# Patient Record
(demographics unavailable — no encounter records)

---

## 2024-10-07 NOTE — PHYSICAL EXAM
[Alert] : alert [No Acute Distress] : no acute distress [Normocephalic] : normocephalic [Conjunctivae with no discharge] : conjunctivae with no discharge [PERRL] : PERRL [EOMI Bilateral] : EOMI bilateral [Auricles Well Formed] : auricles well formed [Clear Tympanic membranes with present light reflex and bony landmarks] : clear tympanic membranes with present light reflex and bony landmarks [No Discharge] : no discharge [Nares Patent] : nares patent [Pink Nasal Mucosa] : pink nasal mucosa [Palate Intact] : palate intact [Nonerythematous Oropharynx] : nonerythematous oropharynx [Supple, full passive range of motion] : supple, full passive range of motion [No Palpable Masses] : no palpable masses [Symmetric Chest Rise] : symmetric chest rise [Clear to Auscultation Bilaterally] : clear to auscultation bilaterally [Regular Rate and Rhythm] : regular rate and rhythm [Normal S1, S2 present] : normal S1, S2 present [No Murmurs] : no murmurs [+2 Femoral Pulses] : +2 femoral pulses [Soft] : soft [NonTender] : non tender [Non Distended] : non distended [Normoactive Bowel Sounds] : normoactive bowel sounds [No Hepatomegaly] : no hepatomegaly [No Splenomegaly] : no splenomegaly [Keo: _____] : Keo [unfilled] [No Abnormal Lymph Nodes Palpated] : no abnormal lymph nodes palpated [No Gait Asymmetry] : no gait asymmetry [No pain or deformities with palpation of bone, muscles, joints] : no pain or deformities with palpation of bone, muscles, joints [Normal Muscle Tone] : normal muscle tone [Straight] : straight [+2 Patella DTR] : +2 patella DTR [Cranial Nerves Grossly Intact] : cranial nerves grossly intact [No Rash or Lesions] : no rash or lesions

## 2024-10-07 NOTE — DISCUSSION/SUMMARY
[Normal Growth] : growth [Normal Development] : development [None] : No known medical problems [No Elimination Concerns] : elimination [No Feeding Concerns] : feeding [No Skin Concerns] : skin [School] : school [Development and Mental Health] : development and mental health [Nutrition and Physical Activity] : nutrition and physical activity [Oral Health] : oral health [Safety] : safety [No Medications] : ~He/She~ is not on any medications [Mother] : mother [Full Activity without restrictions including Physical Education & Athletics] : Full Activity without restrictions including Physical Education & Athletics [I have examined the above-named student and completed the preparticipation physical evaluation. The athlete does not present apparent clinical contraindications to practice and participate in sport(s) as outlined above. A copy of the physical exam is on r] : I have examined the above-named student and completed the preparticipation physical evaluation. The athlete does not present apparent clinical contraindications to practice and participate in sport(s) as outlined above. A copy of the physical exam is on record in my office and can be made available to the school at the request of the parents. If conditions arise after the athlete has been cleared for participation, the physician may rescind the clearance until the problem is resolved and the potential consequences are completely explained to the athlete (and parents/guardians). [] : The components of the vaccine(s) to be administered today are listed in the plan of care. The disease(s) for which the vaccine(s) are intended to prevent and the risks have been discussed with the caretaker.  The risks are also included in the appropriate vaccination information statements which have been provided to the patient's caregiver.  The caregiver has given consent to vaccinate. [FreeTextEntry1] : Flu vaccine given and discussed. Annual PE/Dental. Balanced nutrition and daily exercise encouraged.

## 2024-10-07 NOTE — HISTORY OF PRESENT ILLNESS
[Mother] : mother [Fruit] : fruit [Vegetables] : vegetables [Meat] : meat [Grains] : grains [Eggs] : eggs [Dairy] : dairy [Normal] : Normal [Brushing teeth twice/d] : brushing teeth twice per day [Flossing teeth] : flossing teeth [Yes] : Patient goes to dentist yearly [Tap water] : Primary Fluoride Source: Tap water [Premenarche] : premenarche [Has Friends] : has friends [Grade ___] : Grade [unfilled] [No] : No cigarette smoke exposure [Up to date] : Up to date [NO] : No [FreeTextEntry7] : none [FreeTextEntry1] : Well visit for this  9 year old who is healthy. NKA. SHe eats well, sleeps well, has regular BMs and voids. SHe wants the Flu vaccine today. She is physically active, dances, does basketball, does softball, and is a swimmer.

## 2025-02-04 NOTE — HISTORY OF PRESENT ILLNESS
[de-identified] : Sore throat [FreeTextEntry6] : Sore throat since Sunday. No stuffy nose, no fever. Still able to eat and drink. No one else is sick at home. This office is her medical home.

## 2025-02-04 NOTE — PHYSICAL EXAM
[Acute Distress] : no acute distress [Alert] : alert [Tenderness] : no tenderness [EOMI] : grossly EOMI [Clear] : right tympanic membrane clear [Pink Nasal Mucosa] : pink nasal mucosa [Inflamed Nasal Mucosa] : no nasal mucosa inflammation [Erythematous Oropharynx] : erythematous oropharynx [Enlarged Tonsils] : tonsils not enlarged [Vesicles] : no vesicles [Exudate] : no exudate [Ulcerative Lesions] : no ulcerative lesions [Palate petechiae] : palate without petechiae [Supple] : supple [FROM] : full passive range of motion [Symmetric Chest Wall] : symmetric chest wall [Clear to Auscultation Bilaterally] : clear to auscultation bilaterally [NL] : no abnormal lymph nodes palpated [FreeTextEntry1] : T97.7F

## 2025-02-04 NOTE — DISCUSSION/SUMMARY
[FreeTextEntry1] : Rapid strep negative so sending throat culture to lab. Warm salt water gargles, lozenges, Citrus fruits/juices, honey, PO clear fluids. F/U if not resolving.